# Patient Record
Sex: FEMALE | Race: BLACK OR AFRICAN AMERICAN
[De-identification: names, ages, dates, MRNs, and addresses within clinical notes are randomized per-mention and may not be internally consistent; named-entity substitution may affect disease eponyms.]

---

## 2017-02-06 ENCOUNTER — HOSPITAL ENCOUNTER (EMERGENCY)
Dept: HOSPITAL 26 - MED | Age: 52
Discharge: HOME | End: 2017-02-06
Payer: MEDICAID

## 2017-02-06 VITALS — WEIGHT: 150 LBS | HEIGHT: 67 IN | BODY MASS INDEX: 23.54 KG/M2

## 2017-02-06 VITALS — DIASTOLIC BLOOD PRESSURE: 95 MMHG | SYSTOLIC BLOOD PRESSURE: 160 MMHG

## 2017-02-06 VITALS — DIASTOLIC BLOOD PRESSURE: 80 MMHG | SYSTOLIC BLOOD PRESSURE: 120 MMHG

## 2017-02-06 DIAGNOSIS — M70.52: Primary | ICD-10-CM

## 2017-02-06 DIAGNOSIS — F17.200: ICD-10-CM

## 2017-02-06 DIAGNOSIS — L03.116: ICD-10-CM

## 2017-02-06 PROCEDURE — 99284 EMERGENCY DEPT VISIT MOD MDM: CPT

## 2017-02-06 PROCEDURE — 73562 X-RAY EXAM OF KNEE 3: CPT

## 2017-02-06 PROCEDURE — 96372 THER/PROPH/DIAG INJ SC/IM: CPT

## 2017-02-06 NOTE — NUR
RECIEVED PT TO ED. PT C.O. LEFT KNEE PAIN/SWELLING. PT IS A/O X3, STABLE. NO 
ACUTE DISTRESS NOTED. DENIES CHEST PAIN, SOB, AND N/V. VSS ARE STABLE. WILL 
CONTINUE TO MONITOR PT.

## 2017-02-06 NOTE — NUR
Patient discharged with v/s stable. Written and verbal after care instructions 
given and explained. 

Patient alert, oriented and verbalized understanding of instructions. 
Ambulatory with steady gait. All questions addressed prior to discharge. ID 
band removed. Patient advised to follow up with PMD. Rx of BACTRIM, KEFLEX AND 
MOTRIN given. Patient educated on indication of medication including possible 
reaction and side effects. Opportunity to ask questions provided and answered.

## 2017-03-05 ENCOUNTER — HOSPITAL ENCOUNTER (EMERGENCY)
Dept: HOSPITAL 26 - MED | Age: 52
Discharge: HOME | End: 2017-03-05
Payer: MEDICAID

## 2017-03-05 VITALS — SYSTOLIC BLOOD PRESSURE: 141 MMHG | DIASTOLIC BLOOD PRESSURE: 88 MMHG

## 2017-03-05 VITALS — HEIGHT: 66 IN | WEIGHT: 165 LBS | BODY MASS INDEX: 26.52 KG/M2

## 2017-03-05 VITALS — DIASTOLIC BLOOD PRESSURE: 87 MMHG | SYSTOLIC BLOOD PRESSURE: 132 MMHG

## 2017-03-05 DIAGNOSIS — M70.52: Primary | ICD-10-CM
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
## 2017-03-05 PROCEDURE — 99284 EMERGENCY DEPT VISIT MOD MDM: CPT

## 2017-03-05 PROCEDURE — 96372 THER/PROPH/DIAG INJ SC/IM: CPT

## 2017-03-05 PROCEDURE — 20610 DRAIN/INJ JOINT/BURSA W/O US: CPT

## 2017-03-05 NOTE — NUR
PATIENT PRESENTS TO ED WITH LEFT LEG PAIN AND SWELLING . PT STATES RECENTLY 
BEING DIAGNOSED WITH CELLULITIS TO LEFT LEG, AND COMPLETED PRESCRIBED ABX. 
DENIES N/V/D; SKIN IS PINK/WARM/DRY; AAOX4 WITH EVEN AND STEADY GAIT; LUNGS 
CLEAR BL; HR EVEN AND REGULAR; PT DENIES ANY FEVER, CP, SOB, OR COUGH AT THIS 
TIME; PATIENT STATES PAIN OF 9/10 AT THIS TIME; VSS; PATIENT POSITIONED FOR 
COMFORT; HOB ELEVATED; BEDRAILS UP X2; BED DOWN. ER MD MADE AWARE OF PT STATUS.

## 2017-03-05 NOTE — NUR
Patient discharged with v/s stable. Written and verbal after care instructions 
given and explained. 

Patient alert, oriented and verbalized understanding of instructions. 
Ambulatory with steady gait. All questions addressed prior to discharge. ID 
band removed. Patient advised to follow up with PMD. Rx of NORCO 5MG-325MG 
TABLET AND NAPROSYN 500MG TABLET given. Patient educated on indication of 
medication including possible reaction and side effects. Opportunity to ask 
questions provided and answered.

## 2017-04-23 ENCOUNTER — HOSPITAL ENCOUNTER (EMERGENCY)
Dept: HOSPITAL 26 - MED | Age: 52
Discharge: HOME | End: 2017-04-23
Payer: MEDICAID

## 2017-04-23 VITALS — HEIGHT: 66 IN | BODY MASS INDEX: 27.32 KG/M2 | WEIGHT: 170 LBS

## 2017-04-23 VITALS — SYSTOLIC BLOOD PRESSURE: 132 MMHG | DIASTOLIC BLOOD PRESSURE: 84 MMHG

## 2017-04-23 VITALS — DIASTOLIC BLOOD PRESSURE: 77 MMHG | SYSTOLIC BLOOD PRESSURE: 127 MMHG

## 2017-04-23 DIAGNOSIS — M25.562: Primary | ICD-10-CM

## 2017-04-23 NOTE — NUR
PATIENT PRESENTS TO ED WITH C/O LEFT KNEE PAIN . PT  DENIES N/V/D; SKIN IS 
PINK/WARM/DRY; AAOX4 WITH EVEN AND STEADY GAIT; LUNGS CLEAR BL; HR EVEN AND 
REGULAR; PT DENIES ANY FEVER, CP, SOB, OR COUGH AT THIS TIME; PATIENT STATES 
PAIN OF 8/10 AT THIS TIME; VSS; PATIENT POSITIONED FOR COMFORT; HOB ELEVATED; 
BEDRAILS UP X2; BED DOWN. ER MD MADE AWARE OF PT STATUS.

## 2017-04-23 NOTE — NUR
Patient discharged with v/s stable. Written and verbal after care instructions 
given and explained. 

Patient alert, oriented and verbalized understanding of instructions. 
Ambulatory with steady gait. All questions addressed prior to discharge. ID 
band removed. Patient advised to follow up with PMD. Rx of Naproxen given. 
Patient educated on indication of medication including possible reaction and 
side effects. Opportunity to ask questions provided and answered.

## 2017-08-20 NOTE — NUR
Patient discharged with v/s stable. Written and verbal after care instructions 
given and explained. 

Patient alert, oriented and verbalized understanding of instructions. 
Ambulatory with steady gait. All questions addressed prior to discharge. ID 
band removed. Patient advised to follow up with PMD. Rx of norco, motrin given. 
Patient educated on indication of medication including possible reaction and 
side effects. Opportunity to ask questions provided and answered.

## 2019-06-09 NOTE — NUR
pt came into er with c/o pain to the right eye. pt also had some discomfort 
under her breasts. pt has a mild rash under breast area. pt eye was red. pt 
denied blurred vision. pt is a/ox4. er md made aware. safety measures in place. 
pt pain level is 6/10 at this time.

## 2019-06-09 NOTE — NUR
Patient discharged with v/s stable. Written and verbal after care instructions 
given and explained. 

Patient alert, oriented and verbalized understanding of instructions. 
Ambulatory with steady gait. All questions addressed prior to discharge. ID 
band removed. Patient advised to follow up with PMD. Rx of CIPRO OPHTHALMIC 
SOL, MOTRIN, AND KETOCONAZOLE TOPICAL CREAM given. Patient educated on 
indication of medication including possible reaction and side effects. 
Opportunity to ask questions provided and answered.

## 2021-09-06 NOTE — NUR
Patient discharged with v/s stable. Written and verbal after care instructions 
given and explained. 

Patient alert, oriented and verbalized understanding of instructions. 
Ambulatory with steady gait. All questions addressed prior to discharge. ID 
band removed. Patient advised to follow up with PMD. Rx of Furosemide given. 
Patient educated on indication of medication including possible reaction and 
side effects. Opportunity to ask questions provided and answered.

## 2021-09-30 NOTE — NUR
pt is resting. opens eyes to sound. equal rise and fall of chest wall. pt in 
stable condition. bed locked in lowest position, side rails x2.

## 2021-09-30 NOTE — NUR
57 yo f bib self with c/c of vaginal itch x4days. pt states their is discharge 
but doesnt remember what color it is. states their is a bad smell although it 
is not a fishy smell. pt states she has burning inside and itching on thighs. 
pt has been applying a medicated powder she given for her arm pits, little to 
no relief. pt denies having sex, stated she is celibate. 



hx:denies

rx:psych meds

denies allerg

## 2021-09-30 NOTE — NUR
Patient discharged with v/s stable. Written and verbal after care instructions 
given and explained. 

Patient alert, oriented and verbalized understanding of instructions. 
Ambulatory with steady gait. All questions addressed prior to discharge. ID 
band removed. Patient advised to follow up with PMD. Rx of keflex and flagyl 
given. Patient educated on indication of medication including possible reaction 
and side effects. Opportunity to ask questions provided and answered.

## 2022-07-12 NOTE — NUR
pt requested to be placed outside in wheelchair so she can smoke. pt in 
wheelchair outside at thi time

## 2022-07-12 NOTE — NUR
Patient discharged with v/s stable. Written and verbal after care instructions 
given and explained. 

Patient alert, oriented and verbalized understanding of instructions. 
Ambulatory with friend to car. All questions addressed prior to discharge. ID 
band removed. Patient advised to follow up with PMD. Rx of norco, voltaren 
(sent) given. Patient educated on indication of medication including possible 
reaction and side effects. Opportunity to ask questions provided and answered.

## 2022-07-12 NOTE — NUR
56 y/o female, c/o left knee pain that started this morning. pt states "i want 
my knee drained and a hydrocortisone shot ". skin is pink/warm/dry. a&o x4, 
states she is unable to walk on it. lungs clear bl, heart rate even and 
regular. pt denies any fever, cp, sob, or cough at this time. pt states pain is 
9/10 at this time. ermd made aware of pt.



[facility]: yarelis rosas

med: denies